# Patient Record
Sex: FEMALE | Race: WHITE | Employment: UNEMPLOYED | URBAN - METROPOLITAN AREA
[De-identification: names, ages, dates, MRNs, and addresses within clinical notes are randomized per-mention and may not be internally consistent; named-entity substitution may affect disease eponyms.]

---

## 2021-04-04 ENCOUNTER — HOSPITAL ENCOUNTER (INPATIENT)
Age: 21
LOS: 9 days | Discharge: HOME | DRG: 881 | End: 2021-04-13
Attending: PSYCHIATRY & NEUROLOGY | Admitting: PSYCHIATRY & NEUROLOGY
Payer: COMMERCIAL

## 2021-04-04 PROBLEM — F32.A DEPRESSION: Status: ACTIVE | Noted: 2021-04-04

## 2021-04-04 PROCEDURE — 65220000003 HC RM SEMIPRIVATE PSYCH

## 2021-04-04 PROCEDURE — 74011250637 HC RX REV CODE- 250/637: Performed by: PSYCHIATRY & NEUROLOGY

## 2021-04-04 RX ORDER — ACETAMINOPHEN 325 MG/1
650 TABLET ORAL
Status: DISCONTINUED | OUTPATIENT
Start: 2021-04-04 | End: 2021-04-13 | Stop reason: HOSPADM

## 2021-04-04 RX ORDER — ADHESIVE BANDAGE
30 BANDAGE TOPICAL DAILY PRN
Status: DISCONTINUED | OUTPATIENT
Start: 2021-04-04 | End: 2021-04-13 | Stop reason: HOSPADM

## 2021-04-04 RX ORDER — NORETHINDRONE ACETATE AND ETHINYL ESTRADIOL 1.5-30(21)
1 KIT ORAL
Status: DISCONTINUED | OUTPATIENT
Start: 2021-04-04 | End: 2021-04-13 | Stop reason: HOSPADM

## 2021-04-04 RX ORDER — HYDROXYZINE 50 MG/1
50 TABLET, FILM COATED ORAL
Status: DISCONTINUED | OUTPATIENT
Start: 2021-04-04 | End: 2021-04-13 | Stop reason: HOSPADM

## 2021-04-04 RX ORDER — HALOPERIDOL 5 MG/ML
5 INJECTION INTRAMUSCULAR
Status: DISCONTINUED | OUTPATIENT
Start: 2021-04-04 | End: 2021-04-13 | Stop reason: HOSPADM

## 2021-04-04 RX ORDER — DIPHENHYDRAMINE HYDROCHLORIDE 50 MG/ML
50 INJECTION, SOLUTION INTRAMUSCULAR; INTRAVENOUS
Status: DISCONTINUED | OUTPATIENT
Start: 2021-04-04 | End: 2021-04-13 | Stop reason: HOSPADM

## 2021-04-04 RX ORDER — TRAZODONE HYDROCHLORIDE 50 MG/1
50 TABLET ORAL
Status: DISCONTINUED | OUTPATIENT
Start: 2021-04-04 | End: 2021-04-13 | Stop reason: HOSPADM

## 2021-04-04 RX ORDER — OLANZAPINE 5 MG/1
5 TABLET ORAL
Status: DISCONTINUED | OUTPATIENT
Start: 2021-04-04 | End: 2021-04-13 | Stop reason: HOSPADM

## 2021-04-04 RX ORDER — LORAZEPAM 2 MG/ML
1 INJECTION INTRAMUSCULAR
Status: DISCONTINUED | OUTPATIENT
Start: 2021-04-04 | End: 2021-04-13 | Stop reason: HOSPADM

## 2021-04-04 RX ORDER — BENZTROPINE MESYLATE 1 MG/1
1 TABLET ORAL
Status: DISCONTINUED | OUTPATIENT
Start: 2021-04-04 | End: 2021-04-13 | Stop reason: HOSPADM

## 2021-04-04 RX ADMIN — NORETHINDRONE ACETATE AND ETHINYL ESTRADIOL 1 TABLET: KIT at 21:23
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
## 2021-04-05 PROCEDURE — 65220000003 HC RM SEMIPRIVATE PSYCH

## 2021-04-05 RX ORDER — MIRTAZAPINE 30 MG/1
30 TABLET, FILM COATED ORAL
Status: DISCONTINUED | OUTPATIENT
Start: 2021-04-05 | End: 2021-04-08

## 2021-04-05 RX ORDER — SERTRALINE HYDROCHLORIDE 25 MG/1
25 TABLET, FILM COATED ORAL DAILY
Status: DISCONTINUED | OUTPATIENT
Start: 2021-04-05 | End: 2021-04-08

## 2021-04-05 RX ADMIN — NORETHINDRONE ACETATE AND ETHINYL ESTRADIOL 1 TABLET: KIT at 22:04
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
## 2021-04-05 NOTE — BH NOTES
Patient laying in the bed and when writer entered her room and stated her name she opened her eyes and looked at the writer but did not say anything. Writer offered to get her something fresh to drink, a snack, and/or bring her dinner to her room for her. Patient did not say a word to the writer and continued to lay in the bed and tap her leg. Patient was educated that the doctor had started her on a new medication, Zoloft, but patient refused at this time and did not take. Patient continues to lay in the bed. Patient has not had breakfast or lunch today and has been encouraged both times as well as dinner but patient remains in her bed.

## 2021-04-05 NOTE — BH NOTES
DAYSHIFT NOTE:     Patient in the bed this morning and woke up when the writer entered her room to introduce self. Patient stated that she was \"doing good\" this morning. Patient denies having any depression and or anxiety. Denies SI/HI. Denies AH/VH. Patient does not have any scheduled medications ordered for during the day. Patient has remained in her bed. Patient has not gone to groups. Patient had both of her forearms wrapped with gauze this morning that she stated was done \"in the ER two hours away from here. \"     Wound care came to assess patient's wounds and writer went in the room with the wound care nurse to assess. Patient just stared out the window and never acknowledged the writer or the wound care nurse. Patient laying in the bed awake, no distress noted, but did not say one word to staff while we were in her room. Patient's arms had been unwrapped of the gauze which was removed from the patient's room and patient's arms were open to air. Patient has multiple superficial cuts to both her right and left forearms that will remain open to air. No drainage or signs of infection noted at this time. Patient continued to lay in her bed awake and mute. Will continue to monitor.

## 2021-04-05 NOTE — WOUND CARE
Evaluated bilateral forearm wounds. See photos in Media. Owen Colorado RN present during evaluation. Patient staring and non verbal, verbalized photo was being taken and purpose for  Monitoring wound healing progression, but patient did not respond. Reported roll gauze no longer on patient. Searched room and found in trash. Disposed of in trash at nurses station. No wound care recommendations at this time except to monitor for signs and symptoms of infection and report any changes. Please do not hesitate to consult wound care if further needs arise. Thanks.

## 2021-04-05 NOTE — BH NOTES
PSA ATTEMPT    The therapist met with the patient to attempt her PSA. The patient was laying down in bed when the therapist approached her. She was initially looking at the therapist and then shut her eyes for the remainder of the interaction. The patient was also completley  non-verbal when the therapist was speaking with her. She shook her head once to indicate no, which is the only movement she had. The patient was unable to be assessed at this time due to unwillingness to participate in treatment.

## 2021-04-05 NOTE — GROUP NOTE
[facility] GROUP DOCUMENTATION INDIVIDUAL Group Therapy Note Date: 4/5/2021 Group Start Time: 1100 Group End Time: 1200 Group Topic: Process Group - Inpatient [facility] Anabela Barry R 
 
[facility] GROUP DOCUMENTATION GROUP Group Therapy Note Attendees: 6/15 Patients encouraged to discuss the things that have been on their mind, the things that have been bothering them, the things that brought them to the hospital, the things that they need to process. Patients encouraged to be supportive of their peers and to share openly and honestly. Themes surrounding relationships, boundaries, and taking care of self before others emerged and were discussed. Attendance: Did not attend Patient's Goal:  n/a Interventions/techniques: n/a Follows Directions: n/a Interactions: n/a Mental Status: n/a Behavior/appearance: n/a 
 
Goals Achieved: n/a Additional Notes:  Pt encouraged but did not attend group. Angélica Tiwari
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
## 2021-04-05 NOTE — BH NOTES
TX PLAN    The patient declined to sign her treatment plan when the therapist had offered her, by remaining completely mute.

## 2021-04-05 NOTE — BH NOTES
Pt presents flat, somnolent, isolative to self in bed in dark, endorses fatigue, depression, anxiety, declined PRNs, denies urges at this time, states desire to simply sleep w/o further interruption, oriented to all spheres  No bx issues noted, med compliant  Denies SI HI NSSI denies sleep med appetite problems denies AVH  Continuing to monitor

## 2021-04-06 PROCEDURE — 65220000003 HC RM SEMIPRIVATE PSYCH

## 2021-04-06 PROCEDURE — 74011250637 HC RX REV CODE- 250/637: Performed by: PSYCHIATRY & NEUROLOGY

## 2021-04-06 RX ADMIN — NORETHINDRONE ACETATE AND ETHINYL ESTRADIOL 1 TABLET: KIT at 21:16

## 2021-04-06 RX ADMIN — SERTRALINE 25 MG: 25 TABLET, FILM COATED ORAL at 09:09
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
## 2021-04-06 NOTE — BH NOTES
Recreational Therapy Assessment       Pt was approach to complete RT assessment. Pt was lying in bed with eyes open and when name was called pt didn't respond verbally.  Will obtain information from chart

## 2021-04-06 NOTE — BH NOTES
Ms. Beverly Escudero 80-year-old female admitted to the behavioral health floor under TDO from [facility]. She was hospitalized for suicidal ideation and self-injurious behavior and superficial lacerations noted bilaterally on her forearms. Patient was transferred under my care. Patient did not want to participate in conversation. Patient reports she will talk later.   Denied having any questions to me did not voice any suicidal homicidal feelings currently    Mental status examination-  Patient did not want to participate in conversation    Assessment plan-  Continue current medications  Encourage group therapy  Continue close observation  Patient has not active suicidal ideations or plans      Current Facility-Administered Medications:     sertraline (ZOLOFT) tablet 25 mg, 25 mg, Oral, DAILY, Carlitos Jones MD    mirtazapine (REMERON) tablet 30 mg, 30 mg, Oral, QHS, Carlitos Jones MD    OLANZapine (ZyPREXA) tablet 5 mg, 5 mg, Oral, Q6H PRN, Carlitos Jones MD    haloperidol lactate (HALDOL) injection 5 mg, 5 mg, IntraMUSCular, Q6H PRN, Carlitos Adam MD    benztropine (COGENTIN) tablet 1 mg, 1 mg, Oral, BID PRN, Sushila Adam MD    diphenhydrAMINE (BENADRYL) injection 50 mg, 50 mg, IntraMUSCular, BID PRN, Sushila Adam MD    hydrOXYzine HCL (ATARAX) tablet 50 mg, 50 mg, Oral, TID PRN, Sushila Adam MD    LORazepam (ATIVAN) injection 1 mg, 1 mg, IntraMUSCular, Q4H PRN, Sasha Collado MD    traZODone (DESYREL) tablet 50 mg, 50 mg, Oral, QHS PRN, Sasha Collado MD, Stopped at 04/04/21 2123    acetaminophen (TYLENOL) tablet 650 mg, 650 mg, Oral, Q4H PRN, Carlitos Jones MD    magnesium hydroxide (MILK OF MAGNESIA) 400 mg/5 mL oral suspension 30 mL, 30 mL, Oral, DAILY PRN, Carlitos Jones MD    ziprasidone (GEODON) 20 mg in sterile water (preservative free) 1 mL injection, 20 mg, IntraMUSCular, Q12H PRN, Sasha Collado MD    norethindrone-ethinyl estradiol-iron (MICROGESTIN FE1.5/30) 1.5 mg-30 mcg (21)/75 mg (7) tablet tab 1 Tab (Patient Supplied), 1 Tab, Oral, QHS, Sally Celeste MD, 1 Tab at 04/04/21 2122

## 2021-04-06 NOTE — BH NOTES
[facility] Recreational Therapy Assessment    Orientation:  Person, Place, Date and Situation    Reason for Admission:  Pt initially reported \"she was admitted under a TDO after her roommate called the police on her for cutting both of her wrists\" Pt reported Tevin Marsh was not depressed or had any stressors\" When pt was asked by writer Marlene  was her trigger to cutting her wrists\" pt stated \"I don't know I just did it\" Pt became quiet and did not answer any more questions. Chart indicated pt locked herself in the bathroom after telling her friends she wanted to \"end it all\"    Medical Precautions / Conditions:  Did  not identify    Impairments:     Vision:  Wears Glasses Did not identify      Wears Contacts Did not identify      Are they with Patient Did not identify     Hearing Aids Did not identify     Utilization of Ambulatory Devices:  None    Health Problems Preventing Participation in Activities:  Unable to Answer  How:  n/a    Leisure Interest Checklist:  Did not identify    Does patient participate in leisure activities:  Unable to Answer    Setting:  Alone    Other Activities / Skills / Talents:  Did not identify    Do emotions interfere with leisure activities / lifestyles:  Unable to Answer    When engaging in leisure activities, do you forget worries:  Unable to Answer    Do you belong to a Yazidi:  Unable to Answer    Are you active in [employer] activities:  Unable to Answer    Typical Day:  Pt did not describe    Strengths:  Pt did not identify. Chart indicated pt is a college student at [employer]    Limitations / Barriers:  Pt did not identify.  Chart indicated pt declined in her mental status for past month, not eating, not going to classes and has a history of suicide attempts and hospitalizations and pt has been guarded and would not elaborate on stressors or triggers     Treatment Modalities:  Stress Management, Coping Skills, Symptom Recognition, Healthy Thinking, Mood Management and Leisure Skills    Patient Educational  Needs:  Skills to recognize and challenge problematic thinking, Identify positive coping strategies and skills to manage symptoms or moods, Leisure education and Recognition of symptoms and signs    Focus of Treatment:  Introduce positive outlets for self expression and Introduce and encourage the exploration of alternative coping skills    Summary:  Pt initially discussed her reasons for being admitted to the hospital and then became guarded and will not answer any more questions. Pt was approached by staff again and continue to be quiet and not respond verbally. Information obtained from chart and observation.

## 2021-04-06 NOTE — BH NOTES
Client is quiet and withdrawn. Sad affect and depressed mood. She has been lying quietly in bed with eyes closed. She did not eat breakfast but did eat potatoes and salad for lunch after much encouragement. She has been isolated in bed all day. Amits to feeling sad. She has not attended any groups today. Takes meds as prescribed and denies any side effects. Remains on close observation for safety.

## 2021-04-06 NOTE — GROUP NOTE
IP  GROUP DOCUMENTATION INDIVIDUAL Group Therapy Note Date: 4/6/2021 Group Start Time: 1300 Group End Time: 2350 Group Topic: Process Group - Inpatient [facility] 2  NON ACUTE Erin Narayanan [facility] GROUP DOCUMENTATION GROUP Group Therapy Note Attendees: 5 out of 14 
 
1pm Process Group Patients were invited to group and encouraged to discuss their thoughts, feelings, and emotions. Patients were then asked what their goal of the day was. Lastly, patients were encouraged to listen respectfully to and be supportive of their peers. Attendance: Did not attend Patient's Goal:  N/A Interventions/techniques: Other N/A Follows Directions: Other N/A Interactions: Other N/A Mental Status: Other N/A Behavior/appearance: N/A Goals Achieved: N/A Additional Notes:  Pt did not attend group despite having been encouraged to do so. [place]

## 2021-04-06 NOTE — PROGRESS NOTES
Problem: Depressed Mood (Adult/Pediatric)  Goal: *STG: Remains safe in hospital  Outcome: Progressing Towards Goal     Problem: Depressed Mood (Adult/Pediatric)  Goal: *STG: Complies with medication therapy  Outcome: Not Progressing Towards Goal     Patient has remained safe so far this shift. Patient refused her hs psychiatric medication. When staff approached the patient for the therapeutic interview the patient looked down and did not verbally respond. She has a flat and avoidant affect. She has not verbalized anything this shift except to ask for her birth control pills. She refused her hs Remeron. Patient did accept water from the staff. Continue to assess. Since going to bed, patient has been laying down quietly with her eyes closed.

## 2021-04-07 PROCEDURE — 65220000003 HC RM SEMIPRIVATE PSYCH

## 2021-04-07 PROCEDURE — 74011250637 HC RX REV CODE- 250/637: Performed by: PSYCHIATRY & NEUROLOGY

## 2021-04-07 RX ADMIN — NORETHINDRONE ACETATE AND ETHINYL ESTRADIOL 1 TABLET: KIT at 21:09

## 2021-04-07 RX ADMIN — MIRTAZAPINE 30 MG: 30 TABLET, FILM COATED ORAL at 21:09

## 2021-04-07 NOTE — PROGRESS NOTES
Problem: Depressed Mood (Adult/Pediatric)  Goal: *STG: Verbalizes anger, guilt, and other feelings in a constructive manor  Outcome: Progressing Towards Goal     Problem: Depressed Mood (Adult/Pediatric)  Goal: *STG: Remains safe in hospital  Outcome: Progressing Towards Goal     Problem: Depressed Mood (Adult/Pediatric)  Goal: *STG: Complies with medication therapy  Outcome: Progressing Towards Goal     Patient was able to express her feels clearly. Patient has been safe so far this shift. Patient was compliant with medication. Patient remains on close observation. Patient has been in her bed for the vast majority of the shift. Patient displayed little interest in discussing her condition, poor eye contact, not engaging, soft-voice, and avoidant. Patient denied depression, anxiety, SI or HI. Patient declined the HS Remeron and the physician was informed. Patient took her birth control pill. Continue to assess. Since going to bed, patient has been laying down quietly with her eyes closed.

## 2021-04-07 NOTE — BH NOTES
Pt,has been in room isolative, withdrawn,refuses her medication,no interaction with peers, refuses to talk to staff,  But does want to appeal her commitment hearing, feels like she is being held here for nothing,poor insight into issues,noother concerns voiced, remains on close observation,

## 2021-04-07 NOTE — BH NOTES
Patient continuing to decline conversation. She just tears does not want to talk. He is awake alert. She has been refusing medications and nightly has been not engaging in therapy she is not providing any information. She was discussed relating order to treat to be obtained if she continues to do outpatient treatment    Mental status examination-patient is alert oriented to name place does not want to communicate to let any of the providers. Has been a few medications. Insight judgment coping poor she is defiant dismissive and does not want to participate she presents depressed. Assessment and plan  Continue to provide encouragement to share her stressors.   She does not communicate  Continue to assess need for order to treat  Encouraged to participate in treatment      Current Facility-Administered Medications:     sertraline (ZOLOFT) tablet 25 mg, 25 mg, Oral, DAILY, Fanny Rios MD, 25 mg at 04/06/21 0909    mirtazapine (REMERON) tablet 30 mg, 30 mg, Oral, QHS, Carlitos Jones MD    OLANZapine (ZyPREXA) tablet 5 mg, 5 mg, Oral, Q6H PRN, Konrad Jones MD    haloperidol lactate (HALDOL) injection 5 mg, 5 mg, IntraMUSCular, Q6H PRN, Fanny Rios MD    benztropine (COGENTIN) tablet 1 mg, 1 mg, Oral, BID PRN, Fanny Rios MD    diphenhydrAMINE (BENADRYL) injection 50 mg, 50 mg, IntraMUSCular, BID PRN, Fanny Rios MD    hydrOXYzine HCL (ATARAX) tablet 50 mg, 50 mg, Oral, TID PRN, Konrad Jones MD    LORazepam (ATIVAN) injection 1 mg, 1 mg, IntraMUSCular, Q4H PRN, Fanny Rios MD    traZODone (DESYREL) tablet 50 mg, 50 mg, Oral, QHS PRN, Fanny Rios MD, Stopped at 04/04/21 2123    acetaminophen (TYLENOL) tablet 650 mg, 650 mg, Oral, Q4H PRN, Carlitos Jones MD    magnesium hydroxide (MILK OF MAGNESIA) 400 mg/5 mL oral suspension 30 mL, 30 mL, Oral, DAILY PRN, Carlitos Jones MD    ziprasidone (GEODON) 20 mg in sterile water (preservative free) 1 mL injection, 20 mg, IntraMUSCular, Q12H PRN, Alley Ibrahim MD    norethindrone-ethinyl estradiol-iron (MICROGESTIN FE1.5/30) 1.5 mg-30 mcg (21)/75 mg (7) tablet tab 1 Tab (Patient Supplied), 1 Tab, Oral, QHS, Alley Ibrahim MD, 1 Tab at 04/06/21 6660

## 2021-04-07 NOTE — BH NOTES
TDO DISPO:      Patient was committed for up to 7 days on 4/7/21 per Jerlyn Runner, and was represented by Lisa Ritter. Paperwork placed on pt chart, nursing staff notified.

## 2021-04-07 NOTE — BH NOTES
Behavioral Health Treatment Team Note     Patient goal(s) for today: Earle Nath with stress   Treatment team focus/goals: To continue group therapy and medication management. Progress note: The patient was presenting with a mostly flat affect and congruent mood. She spoke about how she is with having to be committed. She said that she would like to appeal her commitment, and the therapist provided her with the 's information and told her to call him. She denied SI/HI and AH/VH's. She spoke about how she didn't understand why they committed her, although was also able to identify that she is depressed and impulsive. She denied again that the cuts she made to her forearm was a suicide attempt. She said that she didn't want to die but she was just \"tired\". The therapist questioned about what happened again since she denied what was written on the police report. She said that she was not locked in the bathroom because their bathroom does not have a lock on it. She said that she had come home from a party and was feeling depressed. She said that she cut her forearm superficially multiple times and then went to the kitchen to get a snack, which is when her roommate saw her crying. She said that she does not know who called the police, but she assumes it was one of her roommates. She also spoke about work she was doing with her therapist from [facility]. The therapist spoke about all or nothing thinking with her. The therapist also brought up medications again, and spoke about how a mood stabilizer might benefit her since she has never been on one before. She said that she has been on zoloft, prozac, wellbutrin, and abilify in the past at separate times. Initailly she denied that medication has helped her at all, and then said that she was able to recognize and improvement in her mood after she had been taking it daily for years.  An inpatient level of care is still required because the patient is still having a difficult time managing her mood. LOS:  3  Expected LOS: Committed up to 15 days from today. Insurance info/prescription coverage:  [employer] [place] of [place]. Date of last family contact:  4/7/21  Family requesting physician contact today:  no  Discharge plan: The therapist will continue to explore options with the patient. Guns in the home:  no   Outpatient provider(s):  [facility].     Participating treatment team members: Tyrel Gayle LMSW

## 2021-04-07 NOTE — BH NOTES
Behavioral Health Treatment Team Note     Patient goal(s) for today: Unable to assess at this time due to the patient being mute. Treatment team focus/goals: To continue group therapy and medication management. Progress note: The patient was laying in her bed when the therapist approached her. She initially had her eyes open and then she closed them for the remainder of the time the therapist was trying to speak with her. She did not respond to any questions being asked by the therapist. An inpatient level of care is still necessary because the patient is not participating in treatment and is selectively mute with staff. LOS:  3  Expected LOS: 5-7 days, until her hearing tomorrow on 4/7/21. Insurance info/prescription coverage:  [employer] out of state. Date of last family contact:  4/7/21  Family requesting physician contact today:  no  Discharge plan: The therapist will continue to explore options with the patient. Guns in the home:  no   Outpatient provider(s):  Unknown at this time.      Participating treatment team members: Tyrel Gayle LMSW

## 2021-04-07 NOTE — GROUP NOTE
DARRIAN  GROUP DOCUMENTATION INDIVIDUAL Group Therapy Note Date: 4/7/2021 Group Start Time: 1100 Group End Time: 1026 Group Topic: Education Group - Inpatient [facility] Group Mackenzie COLMENARES  GROUP DOCUMENTATION GROUP Group Therapy Note Attendees: All pts were encouraged to attend but only 3 out of 12 came. The topic was safety planning. The pts were asked to introduce themselves and to state their current mood. One of the pts already completed a SP so he just sat in and listened while the other two completed their SPs and shared. In the end they were collected and asked to reflect on doing the SP. Attendance: Did not attend [facility]

## 2021-04-08 PROCEDURE — 65220000003 HC RM SEMIPRIVATE PSYCH

## 2021-04-08 PROCEDURE — 74011250637 HC RX REV CODE- 250/637: Performed by: PSYCHIATRY & NEUROLOGY

## 2021-04-08 RX ORDER — MIRTAZAPINE 15 MG/1
15 TABLET, FILM COATED ORAL
Status: DISCONTINUED | OUTPATIENT
Start: 2021-04-08 | End: 2021-04-13 | Stop reason: HOSPADM

## 2021-04-08 RX ADMIN — MIRTAZAPINE 15 MG: 15 TABLET, FILM COATED ORAL at 21:14

## 2021-04-08 RX ADMIN — NORETHINDRONE ACETATE AND ETHINYL ESTRADIOL 1 TABLET: KIT at 21:14

## 2021-04-08 NOTE — BH NOTES
COLLATERAL ATTEMPTED     Writer attempted to contact pts mother Priscilla Hicks at [phone]. No answer, VM left, with CM call back number.

## 2021-04-08 NOTE — GROUP NOTE
IP  GROUP DOCUMENTATION INDIVIDUAL Group Therapy Note Date: 4/8/2021 Group Start Time: 0171 Group End Time: 2643 Group Topic: Recreational/Music Therapy SRM 2  NON ACUTE [facility] GROUP DOCUMENTATION GROUP Group Therapy Note Facilitated leisure skills group focused on positive coping skills through social interactions, group activities, music and art tasks Attendees: 5/13 Attendance: Attended Patient's Goal:  *STG: Attends activities and groups Interventions/techniques: Art integration and Supported Follows Directions: Followed directions Interactions: Interacted appropriately Mental Status: Calm Behavior/appearance: Cooperative Goals Achieved: Able to engage in interactions and Able to listen to others Additional Notes:  Pt was receptive to music and songs she selected while in group. Pt was seen working on leisure packet during group time. Oliva Hawk, RT Intern

## 2021-04-08 NOTE — BH NOTES
Patient has been able to share some information and was able to participate in some discussion, still remains dismissive, depressed, do not want to take meds and has been isolating  No si/hi  Voiced  Denies any hallu or paranoia  Sleep states was okay    A/p  Pt encouraged to be compliant with treatment, participating in her therapy  TDO hearing today  No si/hi  Pt has been not taking her meds consisitently as per report    Current Facility-Administered Medications:     sertraline (ZOLOFT) tablet 25 mg, 25 mg, Oral, DAILY, Emeterio Lewis MD, 25 mg at 04/06/21 0909    mirtazapine (REMERON) tablet 30 mg, 30 mg, Oral, QHS, Emeterio Lewis MD, 30 mg at 04/07/21 2109    OLANZapine (ZyPREXA) tablet 5 mg, 5 mg, Oral, Q6H PRN, Cj Jones MD    haloperidol lactate (HALDOL) injection 5 mg, 5 mg, IntraMUSCular, Q6H PRN, Emeterio Lewis MD    benztropine (COGENTIN) tablet 1 mg, 1 mg, Oral, BID PRN, Cj Jones MD    diphenhydrAMINE (BENADRYL) injection 50 mg, 50 mg, IntraMUSCular, BID PRN, Cj Jones MD    hydrOXYzine HCL (ATARAX) tablet 50 mg, 50 mg, Oral, TID PRN, Cj Jones MD    LORazepam (ATIVAN) injection 1 mg, 1 mg, IntraMUSCular, Q4H PRN, Emeterio Lewis MD    traZODone (DESYREL) tablet 50 mg, 50 mg, Oral, QHS PRN, Emeterio Lewis MD, Stopped at 04/04/21 2123    acetaminophen (TYLENOL) tablet 650 mg, 650 mg, Oral, Q4H PRN, Cj Jones MD    magnesium hydroxide (MILK OF MAGNESIA) 400 mg/5 mL oral suspension 30 mL, 30 mL, Oral, DAILY PRN, Carlitos Jones MD    ziprasidone (GEODON) 20 mg in sterile water (preservative free) 1 mL injection, 20 mg, IntraMUSCular, Q12H PRN, Emeterio Lewis MD    norethindrone-ethinyl estradiol-iron (MICROGESTIN FE1.5/30) 1.5 mg-30 mcg (21)/75 mg (7) tablet tab 1 Tab (Patient Supplied), 1 Tab, Oral, QHS, [facility] MD Joshua, 1 Tab at 04/07/21 7693

## 2021-04-08 NOTE — BH NOTES
Patient isolative, no social interaction noted, and has not attended groups. Patient remains in bed, refused zoloft this morning. Affect flat. Healing superficial cuts noted to bilateral lower inner arms. Patient denied depression, anxiety, AH, VH, SI, and HI. Patient remains on close observation, Q 15 minute checks.

## 2021-04-08 NOTE — GROUP NOTE
[facility] GROUP DOCUMENTATION INDIVIDUAL Group Therapy Note Date: 4/8/2021 Group Start Time: 1300 Group End Time: 1400 Group Topic: Process Group - Inpatient SRM CARE MANAGEMENT Estuardo Olmstead [facility] GROUP DOCUMENTATION GROUP Group Therapy Note Attendees: 4 out of 13 participants engaged in process group. Patient discussed feelings, emotions and triggers. Patients provided feedback to each other on how to cope and strategies coping skills to prepare for discharge. Attendance: Did not attend Additional Notes:  Patient decline to attend group. Patient was encouraged to discuss and processing feelings during next session. Huma Galdamez

## 2021-04-08 NOTE — BH NOTES
Patient has been slightly cooperative she has been engaging in discussion. She is committed for up to 7 days. Does report that she wants to appeal the commitment. Patient agrees to consider Abilify to help with her mood swings and depression. She is also been provided education psychoeducation her challenges and her feelings and her diagnosis. Not voicing any active suicidal or homicidal ideations    Mental status examination-patient is alert oriented x3 casually dressed groomed limited eye contact somewhat limited in accepting treatment but has been cooperative has been taking her medications no active evidence of psychosis Insight judgment coping remains impaired    Assessment plan  Start Abilify 5 mg p.o. daily to address mood depression, mood swings. Continue Remeron  Denies any side effects from medications currently  We will continue to assess.   OCD symptoms      Current Facility-Administered Medications:     mirtazapine (REMERON) tablet 30 mg, 30 mg, Oral, QHS, Carlitos Jones MD, 30 mg at 04/07/21 2109    OLANZapine (ZyPREXA) tablet 5 mg, 5 mg, Oral, Q6H PRN, Aubrey Jones MD    haloperidol lactate (HALDOL) injection 5 mg, 5 mg, IntraMUSCular, Q6H PRN, Carlitos Jones MD    benztropine (COGENTIN) tablet 1 mg, 1 mg, Oral, BID PRN, Aubrey Jones MD    diphenhydrAMINE (BENADRYL) injection 50 mg, 50 mg, IntraMUSCular, BID PRN, Sharath Styles MD    hydrOXYzine HCL (ATARAX) tablet 50 mg, 50 mg, Oral, TID PRN, Carlitos Jones MD    LORazepam (ATIVAN) injection 1 mg, 1 mg, IntraMUSCular, Q4H PRN, Sharath Styles MD    traZODone (DESYREL) tablet 50 mg, 50 mg, Oral, QHS PRN, Sharath Styles MD, Stopped at 04/04/21 2123    acetaminophen (TYLENOL) tablet 650 mg, 650 mg, Oral, Q4H PRN, Carlitos Jones MD    magnesium hydroxide (MILK OF MAGNESIA) 400 mg/5 mL oral suspension 30 mL, 30 mL, Oral, DAILY PRN, Carlitos Jones MD    ziprasidone (GEODON) 20 mg in sterile water (preservative free) 1 mL injection, 20 mg, IntraMUSCular, Q12H PRN, Susan Ayon MD    norethindrone-ethinyl estradiol-iron (MICROGESTIN FE1.5/30) 1.5 mg-30 mcg (21)/75 mg (7) tablet tab 1 Tab (Patient Supplied), 1 Tab, Oral, QHS, Susan Ayon MD, 1 Tab at 04/07/21 7183

## 2021-04-08 NOTE — BH NOTES
Pt aware she is committed for 7 days. She was refusing meds during the day but after having a nice long talk with patient before bed she agreed to take the Remeron if it will help her depression and get her out of here. She also says she has taken Zoloft in the past and does not like the side affects and says she informed the doctor that she did not want to take it again. She denies having depression now but does give her an anxiety of 3/10. She says that the depression had been building for awhile and that she did not know how to cope so that was why she was hurting herself/cutting. Pt has been resting well with no distress noted. Respirations regular and unlabored. Will continue to monitor patient [id number] as per unit protocol.

## 2021-04-08 NOTE — BH NOTES
Behavioral Health Treatment Team Note     Patient goal(s) for today: declined to give one  Treatment team focus/goals: medication management, group therapy, family session, DC planning    Progress note: Pt was lying in bed and sat us for check-in. She presented w a flat affect and depressed mood. She appeared annoyed. She reported feeling \"good. \" She denied SI, HI, AVH. She denied depressed and anxiety. DOUG rushing set up family session and continue to work w Dr for OMARI Heard.      LOS:  4  Expected LOS: 15 days     Insurance info/prescription coverage:  [facility] [employer] [place]  Date of last family contact:  yesterday  Family requesting physician contact today: no  Discharge plan:  Therapist will continue to explore options w the pt  Guns in the home: no  Outpatient provider(s):  [facility]     Participating treatment team members: Cheng Kiran, * (assigned SW), JORGE Silverio intern

## 2021-04-08 NOTE — GROUP NOTE
[facility] GROUP DOCUMENTATION INDIVIDUAL Group Therapy Note Date: 4/8/2021 Group Start Time: 1100 Group End Time: 5339 Group Topic: Education Group - Inpatient [facility] Group Dayna Zuluaga [facility] GROUP DOCUMENTATION GROUP Group Therapy Note Attendees: All pts were encouraged to attend but only 7 out of 12 attended. The topic was trauma and the common responses to it. The pts were given two worksheets and asked to introduce themselves as well as state something positive that has happened to them recently. They were then given a trigger warning and told to leave if they needed to. They were asked to give examples of trauma and their own feelings or reactions to a traumatic event. We went through the common reactions and treatment together and they were then asked to list healthy and unhealthy coping skills for trauma. In the end they were asked to reflect on the group and how they felt. Attendance: Did not attend [facility]

## 2021-04-09 PROCEDURE — 74011250637 HC RX REV CODE- 250/637: Performed by: PSYCHIATRY & NEUROLOGY

## 2021-04-09 PROCEDURE — 65220000003 HC RM SEMIPRIVATE PSYCH

## 2021-04-09 RX ORDER — ARIPIPRAZOLE 5 MG/1
5 TABLET ORAL DAILY
Status: DISCONTINUED | OUTPATIENT
Start: 2021-04-09 | End: 2021-04-12

## 2021-04-09 RX ADMIN — TRAZODONE HYDROCHLORIDE 50 MG: 50 TABLET ORAL at 20:41

## 2021-04-09 RX ADMIN — NORETHINDRONE ACETATE AND ETHINYL ESTRADIOL 1 TABLET: KIT at 21:00

## 2021-04-09 RX ADMIN — ARIPIPRAZOLE 5 MG: 5 TABLET ORAL at 15:44

## 2021-04-09 RX ADMIN — MIRTAZAPINE 15 MG: 15 TABLET, FILM COATED ORAL at 20:42

## 2021-04-09 NOTE — GROUP NOTE
IP  GROUP DOCUMENTATION INDIVIDUAL Group Therapy Note Date: 4/9/2021 Group Start Time: 5422 Group End Time: 5191 Group Topic: Recreational/Music Therapy SRM 2  NON ACUTE Dione Dubon [facility] GROUP DOCUMENTATION GROUP Group Therapy Note Facilitated leisure skills group focused on positive coping skills through social interactions, group activities, music and art tasks Attendees: 7/14 Attendance: Attended Patient's Goal:  *STG: Attends activities and groups Interventions/techniques: Art integration and Supported Follows Directions: Followed directions Interactions: Interacted appropriately Mental Status: Calm Behavior/appearance: Cooperative Goals Achieved: Able to engage in interactions and Able to listen to others Additional Notes:  Pt was receptive to music and song she selected while in group. Pt was seen working on leisure packet and interacting with peers. Rene Johnson, RT Intern

## 2021-04-09 NOTE — BH NOTES
Client is quiet and withdrawn. Alert and oriented x 3. She has a little bit better appetite (ate about 60 percent). Conversation is superficial.Not attending scheduled groups and unit activities. Remains on close observation for safety.

## 2021-04-09 NOTE — GROUP NOTE
[facility] GROUP DOCUMENTATION INDIVIDUAL Group Therapy Note Date: 4/9/2021 Group Start Time: 1100 Group End Time: 1407 Group Topic: Education Group - Inpatient [facility] Group Kot[place]iqui Virgen [facility] GROUP DOCUMENTATION GROUP Group Therapy Note Attendees: All pts were encouraged to attend but only 10 out of 14 attended. The topic was mindfulness so the group did a 10 minute guided meditation. They were asked to introduce themselves and to state their current mood. They were then asked if they had meditated before and if they knew what mediation was. They were then asked if it was helpful in the past. After the mediation they were asked if it was helpful and if they had any questions about mindfulness and meditation. Attendance: Attended Patient's Goal:  To attend groups and activities Interventions/techniques: Challenged, Informed, Validated, Promoted peer support, Provide feedback and Supported Follows Directions: Followed directions Interactions: Interacted appropriately Mental Status: Calm and Congruent Behavior/appearance: Attentive, Cooperative and Withdrawn/quiet Goals Achieved: Able to listen to others, Able to self-disclose and Identified feelings Additional Notes:  Pt did not participate but reported feeling \"good. \" 
 
Noel Murrell

## 2021-04-09 NOTE — BH NOTES
COLLATERAL     Writer contacted pts Mom Anthony Donato to obtain collateral information. Anthony Donato stated that the pt had moved to [place] in January of this year to attend school at [employer]. 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 She stated that the pt began missing classes to sleep and that she often has trouble sleeping at night. Anthony Donato stated that she felt the pt was a perfectionist and aimed to get A's in classes and that often overwhelmed her and led to increased depression. Anthony Donato confirmed that cutting is the pt's primary form of self harm and that she has been hospitalized for it in the past. She stated that the pt has been seeing a counselor since the age of 15 and that she has a previous diagnosis of Borderline personality disorder. Mom stated that the pt attempted to take a 'step back' from her overloaded schedule however could not follow through. Mom stated that she does not know how to stick up for herself and that often leads to her taking on to much. Anthony Donato stated that the pt had a previous boyfriend and that may have led to her recent suicide attempt. Anthony Donato informed this writer that a previous counselor suggested that the pt has aspergers, and that she is in agreement with this tentative diagnosis. Anthony Donato stated that the pt had reached out to the counseling center at her school and that she was 'looking for answers', she reported that when the pt did not have answers for what to do it irritated her. Anthony Donato spoke to the pt on Sunday night, and reported that she was angry and volatile and believed that she did not need to be here.

## 2021-04-09 NOTE — BH NOTES
Behavioral Health Treatment Team Note     Patient goal(s) for today: 'get out of my room more'   Treatment team focus/goals: group therapy, collateral, medication management    Progress note: Pt seen sitting up in bed reading a book when speaking with this writer. Pt presented with a broad, slightly flat, affect and congruent mood. Pt stated that she would like to go home today. This writer explained that she would most likely not dc today and at the earliest it would be Monday. Pt stated that she was disappointed with this news but that she would be okay. Pt denied any SI/HI/AVH anxiety or depression at this time. Pt stated that she would like to go home so that she may be able to attend classes. Pt in need of inpatient level of care to continue medication management, and to plan for a safe dc. LOS:  5  Expected LOS: Committed up to 4/13    Insurance info/prescription coverage:  [employer] out of state. Date of last family contact:  Attempted 4/8/21  Family requesting physician contact today:  no  Discharge plan:   The therapist will continue to work on exploring dc options   Guns in the home:  no   Outpatient provider(s):  [facility]     Participating treatment team members: Cj Fernandez, * (assigned SW), [employer]

## 2021-04-09 NOTE — PROGRESS NOTES
Problem: Depressed Mood (Adult/Pediatric)  Goal: *STG: Participates in treatment plan  Outcome: Progressing Towards Goal  Goal: *STG: Verbalizes anger, guilt, and other feelings in a constructive manor  Outcome: Progressing Towards Goal  Goal: *STG: Attends activities and groups  Outcome: Progressing Towards Goal  Goal: *STG: Remains safe in hospital  Outcome: Progressing Towards Goal  Goal: *STG: Complies with medication therapy  Outcome: Progressing Towards Goal  Goal: *LTG: Understands illness and can identify signs of relapse  Outcome: Progressing Towards Goal

## 2021-04-09 NOTE — BH NOTES
Patient this morning has been cooperative and limited conversation still mostly noted to herself. She has been compliant with her medication. Denies any side effects. No self-harm gestures noted    Mental status examination-patient is alert oriented x3 speech is coherent denies any active suicidal homicidal ideations intent or thought appears and is encouraged limited interactions.   But has been cooperative    Assessment and plan  Family meeting to be scheduled  and collateral reviewed  We will start Abilify 5 mg p.o. to be related to continue to address her mood depression  Continue to further assess her OCD symptoms and anxiety and explore substance abused  s  .me

## 2021-04-09 NOTE — BH NOTES
Patient is quiet, stays to self. Appears sad, but denies depression at this time and only slightly anxious because of being committed. Pt was med compliant tonight and  Is calm and cooperative, very pleasant. Denies SI, HI and hallucinations. Pt resting well with no distress noted. Respirations are regular and unlabored. Will continue to monitor patient every 15mins as per unit protocol.

## 2021-04-09 NOTE — BH NOTES
COLLATERAL     Writer spoke to the pts therapist, Jorge Chavarria. Haley Boland stated that she had only seen the pt twice and that she did not have much information to share. She stated that she felt that the pt was adjusting to college and was having a difficult time doing that. She did not know any details about her mental health history. Haley Boland stated that she was aware of past attempts however she could not provide any details on the pts mental health. Elena advised the writer to call back early in next week and she would be able to provide more information.

## 2021-04-09 NOTE — GROUP NOTE
DARRIAN  GROUP DOCUMENTATION INDIVIDUAL Group Therapy Note Date: 4/9/2021 Group Start Time: 1300 Group End Time: 0857 Group Topic: Process Group - Inpatient [facility] Group Blanca COLMENARES  GROUP DOCUMENTATION GROUP Group Therapy Note Attendees: 4/14 Process: Pts were asked something they want to learn as a check in question. Pts were then encouraged to share what brought them to the hospital. Pts were encouraged to provide feedback to one another. Pts were then walked through a breathing exercise and asked to reflect on group Attendance: Did not attend Additional Notes:  Pt was encouraged to attend but chose not to do so [employer] WNWD, NAD, active, pink w/o jaundice, wt 3498g down 4.2%  Chest clear w/o murmur  Abd lg but soft, no HSM/MOT  T1 male, testes down

## 2021-04-10 PROCEDURE — 65220000003 HC RM SEMIPRIVATE PSYCH

## 2021-04-10 PROCEDURE — 74011250637 HC RX REV CODE- 250/637: Performed by: PSYCHIATRY & NEUROLOGY

## 2021-04-10 RX ADMIN — NORETHINDRONE ACETATE AND ETHINYL ESTRADIOL 1 TABLET: KIT at 21:42

## 2021-04-10 RX ADMIN — MIRTAZAPINE 15 MG: 15 TABLET, FILM COATED ORAL at 21:42

## 2021-04-10 RX ADMIN — ARIPIPRAZOLE 5 MG: 5 TABLET ORAL at 09:16

## 2021-04-10 NOTE — GROUP NOTE
[facility] GROUP DOCUMENTATION INDIVIDUAL Group Therapy Note Date: 4/10/2021 Group Start Time: 1100 Group End Time: 3753 Group Topic: Process Group - Inpatient SRM 2  NON ACUTE Erin Narayanan [facility] GROUP DOCUMENTATION GROUP Group Therapy Note Attendees: 10 out of 14 
 
11am Process Group Patients were invited to group and encouraged to share their thoughts, feelings, and emotions. Patients were asked to share their goal for the day with the group. Patients were also asked what they feel needs to change in their lives in order to improve their mental health. Lastly, the patients were asked to listen respectfully to and be supportive of their peers. Attendance: Attended Patient's Goal:  Develop coping skills Interventions/techniques: Validated, Promoted peer support, Provide feedback and Supported Follows Directions: Followed directions Interactions: Interacted appropriately Mental Status: Calm and Flat Behavior/appearance: Attentive and Cooperative Goals Achieved: Able to engage in interactions, Able to listen to others, Able to give feedback to another, Able to reflect/comment on own behavior, Able to manage/cope with feelings, Able to receive feedback, Able to experience relief/decrease in symptoms, Able to self-disclose, Discussed discharge plans, Displayed empathy, Identified feelings, Identified triggers and Verbalized increased hopefulness Additional Notes:  Pt stated that her goal for today is \"to get out of my room. \" Pt also spoke about getting on and staying on the right medications so that she can feel well mentally. Pt empathized with peers when they stated that they were having a difficult time getting psychiatry appointments because everyone is so booked. Pt was calm and appropriate while in group. [place]

## 2021-04-10 NOTE — PROGRESS NOTES
Problem: Depressed Mood (Adult/Pediatric)  Goal: *STG: Remains safe in hospital  Outcome: Progressing Towards Goal  Goal: *STG: Complies with medication therapy  Outcome: Progressing Towards Goal     Problem: Depressed Mood (Adult/Pediatric)  Goal: *STG: Attends activities and groups  Outcome: Not Progressing Towards Goal  Pt has isolated in her room, only out to have needs met and returned back to her room lying down while eyes closed. Pt denied feeling depression and thoughts of self harm. Pt was encouraged to spend time in the dayroom with her peers and to attend structured groups to no avail. Will continue to monitor pt closely and follow treatment plan as written.

## 2021-04-10 NOTE — BH NOTES
Patient has been visible on the unit. Mostly in her room, resting quietly. Her affect is dull, but she animated. She denies depression, anxiety, SI, HI, VH & AH. She medication compliant. Patient stated that she did want hurt herself earlier but not now. 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 She remains on close observation for safety.

## 2021-04-10 NOTE — GROUP NOTE
DARRIAN  GROUP DOCUMENTATION INDIVIDUAL Group Therapy Note Date: 4/10/2021 Group Start Time: 2795 Group End Time: 5257 Group Topic: Nursing SRM 2 BEHA [facility] ACUTE Ben Treadwell LPN 
 
[facility] GROUP DOCUMENTATION GROUP Group Therapy Note Attendees: 5/14 Attendance: Did not attend Patient's Goal: ID benefits of Medications Interventions/techniques: Follows Directions:  
 
Interactions:  
 
Mental Status:  
 
Behavior/appearance:  
 
Goals Achieved: Additional Notes: Pt. Invited did not attend.   
Jenae Ramirez LPN

## 2021-04-10 NOTE — BH NOTES
Behavioral Health Treatment Team Note     Patient goal(s) for today: 'be positive   Treatment team focus/goals: medication management, dc planning     Progress note: Pt presented with a flat affect and depressed mood. Pt reported that she felt good this morning and that she has been attending some groups. Pt was very flat and did not provide detailed answers to this writers questions. Pt denies any SI/AVH/HI depression or anxiety this morning. Pt in need of inpatient level of care due to continued flat presentation and severity of sucide attempt.     LOS:  6  Expected LOS: 5-7    Insurance info/prescription coverage:   [employer] [place] of [place].   Date of last family contact:  4/9/21  Family requesting physician contact today:  no  Discharge plan:  Therapist and pt will work together to determine [address] [place] [address] in the home:  no   Outpatient provider(s):  [facility]     Participating treatment team members: Lisa Lui, * (assigned SW), Erlinda Doan

## 2021-04-10 NOTE — CONSULTS
Consult    Patient: Eli Fletcher MRN: [id number]  SSN: [id number]    YOB: 2000  Age: 21 y.o. Sex: female      Subjective:      Eli Fletcher is a 21 y.o. female who is being seen for depression denies any medical history of cough chest pain fever or chills    No past medical history on file. No past surgical history on file. No family history on file.   Social History     Tobacco Use    Smoking status: Not on file   Substance Use Topics    Alcohol use: Not on file      Current Facility-Administered Medications   Medication Dose Route Frequency Provider Last Rate Last Admin    ARIPiprazole (ABILIFY) tablet 5 mg  5 mg Oral DAILY Chelsea Solo MD   5 mg at 04/10/21 0916    mirtazapine (REMERON) tablet 15 mg  15 mg Oral QHS Alan Figueroa MD   Stopped at 04/09/21 2200    OLANZapine (ZyPREXA) tablet 5 mg  5 mg Oral Q6H PRN Esperanza Feliciano MD        haloperidol lactate (HALDOL) injection 5 mg  5 mg IntraMUSCular Q6H PRN Esperanza Feliciano MD        benztropine (COGENTIN) tablet 1 mg  1 mg Oral BID PRN Esperanza Feliciano MD        diphenhydrAMINE (BENADRYL) injection 50 mg  50 mg IntraMUSCular BID PRN Koduri, Vila Leyden, MD        hydrOXYzine HCL (ATARAX) tablet 50 mg  50 mg Oral TID PRN Esperanza Feliciano MD        LORazepam (ATIVAN) injection 1 mg  1 mg IntraMUSCular Q4H SHIVAM Feliciano MD        traZODone (DESYREL) tablet 50 mg  50 mg Oral QHS PRN Esperanza Feliciano MD   50 mg at 04/09/21 2041    acetaminophen (TYLENOL) tablet 650 mg  650 mg Oral Q4H PRN Esperanza Feliciano MD        magnesium hydroxide (MILK OF MAGNESIA) 400 mg/5 mL oral suspension 30 mL  30 mL Oral DAILY PRN Esperanza Feliciano MD        ziprasidone (GEODON) 20 mg in sterile water (preservative free) 1 mL injection  20 mg IntraMUSCular Q12H PRPORTER Feliciano MD        norethindrone-ethinyl estradiol-iron (MICROGESTIN FE1.5/30) 1.5 mg-30 mcg (21)/75 mg (7) tablet tab 1 Tab (Patient Supplied)  1 Tab Oral QHS Michelet Marie, Sushila Nava MD   1 Tab at 04/09/21 2100        No Known Allergies    Review of Systems:  A comprehensive review of systems was negative except for that written in the History of Present Illness. Objective:     Vitals:    04/09/21 0806 04/09/21 1117 04/09/21 2015 04/10/21 0906   BP: 95/66 95/66 132/74 94/60   Pulse: 72 72 67 63   Resp: 18 18 18 18   Temp: 98.4 °F (36.9 °C) 98.4 °F (36.9 °C) 97.8 °F (36.6 °C) 98 °F (36.7 °C)   SpO2: 97%      Weight:       Height:            Physical Exam:  General:  Alert, cooperative, no distress, appears stated age. Eyes:  Conjunctivae/corneas clear. PERRL, EOMs intact. Fundi benign   Ears:  Normal TMs and external ear canals both ears. Nose: Nares normal. Septum midline. Mucosa normal. No drainage or sinus tenderness. Mouth/Throat: Lips, mucosa, and tongue normal. Teeth and gums normal.   Neck: Supple, symmetrical, trachea midline, no adenopathy, thyroid: no enlargment/tenderness/nodules, no carotid bruit and no JVD. Back:   Symmetric, no curvature. ROM normal. No CVA tenderness. Lungs:   Clear to auscultation bilaterally. Heart:  Regular rate and rhythm, S1, S2 normal, no murmur, click, rub or gallop. Abdomen:   Soft, non-tender. Bowel sounds normal. No masses,  No organomegaly. Extremities: Extremities normal, atraumatic, no cyanosis or edema. Pulses: 2+ and symmetric all extremities. Skin: Skin color, texture, turgor normal. No rashes or lesions   Lymph nodes: Cervical, supraclavicular, and axillary nodes normal.   Neurologic: CNII-XII intact. Normal strength, sensation and reflexes throughout. No results found for this or any previous visit (from the past 24 hour(s)).     Assessment:     Hospital Problems  Never Reviewed          Codes Class Noted POA    Depression ICD-10-CM: F32.9  ICD-9-CM: 125  4/4/2021 Unknown              Plan:     Continue present treatment    Signed By: Sailaja Brown MD     April 10, 2021

## 2021-04-11 PROCEDURE — 74011250637 HC RX REV CODE- 250/637: Performed by: PSYCHIATRY & NEUROLOGY

## 2021-04-11 PROCEDURE — 65220000003 HC RM SEMIPRIVATE PSYCH

## 2021-04-11 RX ADMIN — NORETHINDRONE ACETATE AND ETHINYL ESTRADIOL 1 TABLET: KIT at 22:26

## 2021-04-11 RX ADMIN — ARIPIPRAZOLE 5 MG: 5 TABLET ORAL at 08:50

## 2021-04-11 RX ADMIN — MIRTAZAPINE 15 MG: 15 TABLET, FILM COATED ORAL at 22:26

## 2021-04-11 NOTE — BH NOTES
Patient has been withdrawn to her room. She denies depression, anxiety, SI, HI, AH & VH. Her affect is bright. She alert & oriented X 4. Pleasant & cooperative. She medication compliant. She states that she does not feel stress while she here. No thought of hurting herself. She remains on close observation for safety.

## 2021-04-11 NOTE — GROUP NOTE
IP  GROUP DOCUMENTATION INDIVIDUAL Group Therapy Note Date: 4/11/2021 Group Start Time: 6929 Group End Time: 0669 Group Topic: Education Group - Inpatient SRM 2  NON ACUTE Asha Walden [facility] GROUP DOCUMENTATION GROUP Group Therapy Note Facilitated group discussion focused on defense mechanisms about how and when each person has previously used them Attendees: 11/14 Attendance: Attended Patient's Goal:  *STG: Verbalizes anger, guilt, and other feelings in a constructive manor Interventions/techniques: Informed and Supported Follows Directions: Followed directions Interactions: Interacted appropriately Mental Status: Calm and Flat Behavior/appearance: Attentive and Cooperative Goals Achieved: Able to engage in interactions, Able to listen to others, Able to reflect/comment on own behavior and Able to self-disclose Additional Notes:  Pt was receptive to intervention discussion. Pt reported she has acted out by harming herself and running away. Pt reported she has been in denial of needing her medications and of needing to get help due to her self harm. Pt stated she has devalued herself because she felt like she was a failure. Eugenie Ojeda, RT Intern

## 2021-04-11 NOTE — GROUP NOTE
[facility] GROUP DOCUMENTATION INDIVIDUAL Group Therapy Note Date: 4/11/2021 Group Start Time: 1100 Group End Time: 2536 Group Topic: Process Group - Inpatient SRM 2  NON ACUTE [employer] [facility] GROUP DOCUMENTATION GROUP Group Therapy Note Attendees: 10 out of 14 Patients were invited to group and encouraged to share their thoughts, feelings, and emotions. Patients were asked to share their goals for the day with the group as well. Patients were also asked to speak about the coping skills that they have gained while hospitalized. Lastly, patients were encouraged to listen respectfully to and be supportive of their peers. Attendance: Attended Patient's Goal:  Express feelings openly and develop coping skills Interventions/techniques: Validated, Promoted peer support, Provide feedback and Supported Follows Directions: Followed directions Interactions: Interacted appropriately Mental Status: Calm, Depressed and Flat Behavior/appearance: Attentive and Cooperative Goals Achieved: Able to engage in interactions, Able to listen to others, Able to self-disclose, Displayed empathy, Identified feelings and Identified triggers Additional Notes:  Pt stated that her goal for today is \"to stay positive. \" Pt was otherwise quiet and respectful during group. [place] ex-wife

## 2021-04-11 NOTE — BH NOTES
Pt medication compliant and no negative effects from medications observed or reported. Pt out of room in the morning and among peers for meals. Pt affect generally flat on observation. Affect bright and varied on approach. Pt attending groups. Minimal interactions initiated among peers. Pt reported no suicidal ideation her on the unit. No signs of self harm. Continue 15 minute checks to maintain pt safety.

## 2021-04-11 NOTE — GROUP NOTE
DARRIAN  GROUP DOCUMENTATION INDIVIDUAL Group Therapy Note Date: 4/11/2021 Group Start Time: 1 Group End Time: 1991 Group Topic: Nursing SRM 2 [facility] ACUTE Ben Treadwell LPN IP  GROUP DOCUMENTATION GROUP Group Therapy Note Attendees: 10/14 Attendance: Attended Patient's Goal: ID facts & myths about Interventions/techniques: Challenged Follows Directions: Followed directions Interactions: Interacted appropriately Mental Status: Calm Behavior/appearance: Attentive Goals Achieved: Able to listen to others Additional Notes:  Pt was verbally active in group.  
Claire Thomson LPN

## 2021-04-12 PROCEDURE — 65220000003 HC RM SEMIPRIVATE PSYCH

## 2021-04-12 PROCEDURE — 74011250637 HC RX REV CODE- 250/637: Performed by: PSYCHIATRY & NEUROLOGY

## 2021-04-12 RX ORDER — ARIPIPRAZOLE 5 MG/1
10 TABLET ORAL DAILY
Status: DISCONTINUED | OUTPATIENT
Start: 2021-04-13 | End: 2021-04-13 | Stop reason: HOSPADM

## 2021-04-12 RX ADMIN — MIRTAZAPINE 15 MG: 15 TABLET, FILM COATED ORAL at 21:40

## 2021-04-12 RX ADMIN — ARIPIPRAZOLE 5 MG: 5 TABLET ORAL at 08:48

## 2021-04-12 RX ADMIN — NORETHINDRONE ACETATE AND ETHINYL ESTRADIOL 1 TABLET: KIT at 21:42

## 2021-04-12 NOTE — BH NOTES
The pt has been resting quietly in bed without any distress. When making safety rounds, the pt has aroused. The pt has a broken sleep pattern. Respirations are quiet and unlabored. She remains on close observation with every 15 minute rounding for safety. Throughout the evening, the pt sat quietly in the milieu watching T.V. and appears withdrawn. She denies having any anxiety, depression,  SI, HI, A/VH. Her affect is flat. She is pleasant upon approach with minimal interaction. No c/o pain or discomfort. The pt received scheduled Remeron and her birth control pill. No prn medication requested. She has accepted snacks and something to drink. VSS.

## 2021-04-12 NOTE — PROGRESS NOTES
Progress Note  Date:2021       Room:[facility]  Patient Name:Cristy Downey     Date of Birth:     Age:20 y.o. Subjective    Subjective:  Symptoms:  Stable. Activity level: Normal.     Felix Boss still presents anxious and depressed, but states she ahs has been feeling better everyday. She has been cooperative and complaint with meds. She states that she was feeling overwhelmed with school and work and has been not able to keep up. She states she has been depressed and dint want to accept help. She currently denies any suicidal thoughts. Review of Systems   Psychiatric/Behavioral: Negative for hallucinations, self-injury and suicidal ideas. The patient is nervous/anxious. Objective         Vitals Last 24 Hours:  TEMPERATURE:  Temp  Av.9 °F (36.6 °C)  Min: 97.6 °F (36.4 °C)  Max: 98.2 °F (36.8 °C)  RESPIRATIONS RANGE: Resp  Av  Min: 18  Max: 18  PULSE OXIMETRY RANGE: SpO2  Av %  Min: 97 %  Max: 97 %  PULSE RANGE: Pulse  Av.5  Min: 71  Max: 82  BLOOD PRESSURE RANGE: Systolic (72RYU), HK , Min:98 , UAF:097   ; Diastolic (05XGZ), PGO:64, Min:64, Max:72    I/O (24Hr): No intake or output data in the 24 hours ending 21  Objective  Labs/Imaging/Diagnostics    Labs:  CBC:No results for input(s): WBC, RBC, HGB, HCT, MCV, RDW, PLT, HGBEXT, HCTEXT, PLTEXT in the last 72 hours. CHEMISTRIES:No results for input(s): NA, K, CL, CO2, BUN, CA, PHOS, MG in the last 72 hours. No lab exists for component: CREATININE, GLUCOSEPT/INR:No results for input(s): INR, INREXT in the last 72 hours. No lab exists for component: PROTIME  APTT:No results for input(s): APTT in the last 72 hours. LIVER PROFILE:No results for input(s): AST, ALT in the last 72 hours.     No lab exists for component: BILIDIR, BILITOT, ALKPHOS  No results found for: ALT, AST, GGT, GGTP, AP, APIT, APX, CBIL, TBIL, TBILI    Imaging Last 24 Hours:  No results found.  Assessment//Plan   Active Problems:    Depression (4/4/2021)      Assessment:    Condition: In stable condition. Improving. Plan:   Administer medications as ordered. (Continue classes medications  Provided support psychoeducation  Family meeting scheduled for today  Patient tolerating medications no side effects  No active suicidal or homicidal ideations voiced   still presents anxious  Does not want any further titration or addition of Prozac that she has been recently started on the outpatient.   Patient would benefit from reaching out to her counselor   and patient wants medical leave from school      Current Facility-Administered Medications:     (START ON 4/13/2021) ARIPiprazole (ABILIFY) tablet 10 mg, 10 mg, Oral, DAILY, Chelsea Solo MD    mirtazapine (REMERON) tablet 15 mg, 15 mg, Oral, QHS, Chelsea Solo MD, 15 mg at 04/11/21 2226    OLANZapine (ZyPREXA) tablet 5 mg, 5 mg, Oral, Q6H PRN, Samantha Jones MD    haloperidol lactate (HALDOL) injection 5 mg, 5 mg, IntraMUSCular, Q6H PRN, Theron Long MD    benztropine (COGENTIN) tablet 1 mg, 1 mg, Oral, BID PRN, Samantha Jones MD    diphenhydrAMINE (BENADRYL) injection 50 mg, 50 mg, IntraMUSCular, BID PRN, Theron Long MD    hydrOXYzine HCL (ATARAX) tablet 50 mg, 50 mg, Oral, TID PRN, Samantha Jones MD    LORazepam (ATIVAN) injection 1 mg, 1 mg, IntraMUSCular, Q4H PRN, Theron Long MD    traZODone (DESYREL) tablet 50 mg, 50 mg, Oral, QHS PRN, Theron Long MD, 50 mg at 04/09/21 2041    acetaminophen (TYLENOL) tablet 650 mg, 650 mg, Oral, Q4H PRN, Samantha Jones MD    magnesium hydroxide (MILK OF MAGNESIA) 400 mg/5 mL oral suspension 30 mL, 30 mL, Oral, DAILY PRN, Carlitos Jones MD    ziprasidone (GEODON) 20 mg in sterile water (preservative free) 1 mL injection, 20 mg, IntraMUSCular, Q12H PRN, Theron Long MD    norethindrone-ethinyl estradiol-iron (MICROGESTIN FE1.5/30) 1.5 mg-30 mcg (21)/75 mg (7) tablet tab 1 Tab (Patient Supplied), 1 Tab, Oral, QHS, Donnie Hollis MD, 1 Tab at 04/11/21 7545).        Electronically signed by Sherry Preston MD on 4/12/2021 at 7:49 PM

## 2021-04-12 NOTE — BH NOTES
FAMILY SESSION    The therapist facilitated a family session between the patient and her mother on the phone. The patient was presenting with a broad affect and congruent mood. She also exhibited increased insight. She spoke about how she was beginning to feel overwhelmed with school work, and not being able to manage that and a job. She said that because she is a \"perfectionist\" she then felt like a failure when things began not going her way in school. She said that this thought triggered this depressive episode. She still denied that it was a suciide attempt, and said that instead it was a cry for help, and denied wanting to die. She identified that her warning signs are sleeping all day, not taking care of herself, lack of motivation. She also said that her pride got in the way of her not reaching out for help. She then said that when she did reach out for help and spoke with the school therapist she wanted the therapist to \"read her mind\" instead of telling her things that she wanted, which she was able to challenge as an irrational thought. Her mother encouraged her to find a support system there, stating that she is all the way in [place], and would like for her to have someone closer who she can call. The therapist questioned her about her roommates, and previously feeling anger towards them for calling 911. She said that she was mostly just bitter because she knows that they have their own problems too that they are not addressing. The patient's mom encouraged her to \"unlearn\" things, specifically hurting herself in order to get her needs met, and normalized calling 911 if someone was self-harming. The patient said that she would feel comfortable returning to the school's therapist, and continuing to take medication.  She said that she has noticed an improvement in her mood since being at the hospital. She also said that she would talk to her school therapist about taking a medical leave of absence from school once she returns.

## 2021-04-12 NOTE — BH NOTES
Behavioral Health Treatment Team Note     Patient goal(s) for today: 'I do not have a goal'   Treatment team focus/goals: medication management, dc planning, group therapy     Progress note: Pt presented with a flat affect and congruent mood. Pt seen sleeping in bed when this writer work her up. Pt provided short answers and did not wish to provide many details to this writer. Pt stated that was attending groups on the weekend. Pt denies any SI/HI/ACH, anxiety or depression at this time. Pt in need of inpatient level of care due to severity of suicide attempted and the need for dc planning.     LOS:  8  Expected LOS: 9    Insurance info/prescription coverage:  [employer] [place] of [place].   Date of last family contact:  4/9/21  Family requesting physician contact today:  no  Discharge plan:  Therapist will continue to work together to determine [address] in the home:  no   Outpatient provider(s):  [facility]     Participating treatment team members: Leon Alcantara, * (assigned SW), [employer]

## 2021-04-12 NOTE — PROGRESS NOTES
Problem: Depressed Mood (Adult/Pediatric)  Goal: *STG: Remains safe in hospital  Outcome: Progressing Towards Goal   Pt remains safe while in the hospital. She denies having any suicidal thoughts and no gestures noted. No injuries reported or noted. Problem: Depressed Mood (Adult/Pediatric)  Goal: *STG: Complies with medication therapy  Outcome: Progressing Towards Goal   Pt is med complaint. Problem: Falls - Risk of  Goal: *Absence of Falls  Description: Document Veradal Batters Fall Risk and appropriate interventions in the flowsheet. Outcome: Progressing Towards Goal  Note: Fall Risk Interventions:     Pt ambulates independently. No falls reported or noted.

## 2021-04-12 NOTE — GROUP NOTE
[facility] GROUP DOCUMENTATION INDIVIDUAL Group Therapy Note Date: 4/12/2021 Group Start Time: 1100 Group End Time: 2193 Group Topic: Process Group - Inpatient SRM CARE MANAGEMENT Joaquim Tang LCSW [facility] GROUP DOCUMENTATION GROUP Group Therapy Note Pts participated in group therapy. Pts were asked a check in question about how they were feeling and what is something that they are looking forwards too. Pts then were asked to discuss and share thoughts feelings, and emotions that they may be feeling. Pts were encouraged to share with each other and provide feedback. Pts ended group by sharing one positive thing. Attendees: 13/15 Attendance: Attended Patient's Goal:  Pt working towards goal of attending group therapy Interventions/techniques: Validated, Promoted peer support and Provide feedback Follows Directions: Followed directions Interactions: Interacted appropriately Mental Status: Congruent Behavior/appearance: Attentive and Cooperative Goals Achieved: Able to engage in interactions, Able to listen to others, Able to give feedback to another, Able to reflect/comment on own behavior and Able to manage/cope with feelings Additional Notes:  Pt was very attentive in group therapy. Pt stated that she was a 5/10 and that she was looking forward to going home. Pt expressed frustration about not being moved to the front unit and stated that she felt that she was being told things to appease her. Pt stated that her one positive thing was that she was able to create a stable sleeping schedule. Frankey Boatman, LCSW

## 2021-04-12 NOTE — PROGRESS NOTES
Discussed case interviewed patient  Taking and tolerating medications well  She is on Abilify 5 mg daily  And Remeron 15 mg nightly  Attends groups interacts with peers and staff  Remains with flat affect  Denies hearing voices denies suicidal ideations  Pledges for safety on the unit  No overt aggression    Mental status exam  Alert oriented fair eye contact  Speech is goal-directed soft tone  Mood is depressed affect is anxious  Thought process and illogical  Insight and judgment fair  Does not appear to respond to internal stimuli    Recommendations  Continue inpatient treatments  Follow-up with psychiatric team tomorrow

## 2021-04-12 NOTE — PROGRESS NOTES
Problem: Depressed Mood (Adult/Pediatric)  Goal: *STG: Verbalizes anger, guilt, and other feelings in a constructive manor  Outcome: Progressing Towards Goal     Problem: Depressed Mood (Adult/Pediatric)  Goal: *STG: Remains safe in hospital  Outcome: Progressing Towards Goal     Patient was able to share her feelings. Patient has been safe so far in the hospital.    Patient remains on close observation. Patient has been mostly in her room and bed. She did get up some and sit in the dayroom. She initially read her book. She was later seen talking to a peer. Patient said she was \"good. \"  She denied depression, anxiety, SI or HI. Flat affect. She was medication compliant. Continue to assess. Since going to bed, patient has been laying down quietly with her eyes closed.

## 2021-04-12 NOTE — BH NOTES
Patient pleasant upon approach, affect constricted, up for group this morning, no peer interaction noted, patient was medication compliant. She denied SI, HI, AH, VH, depression and anxiety. Patient remains on close observation, Q 15 minute checks.

## 2021-04-13 VITALS
TEMPERATURE: 97.7 F | WEIGHT: 180 LBS | OXYGEN SATURATION: 97 % | HEART RATE: 85 BPM | SYSTOLIC BLOOD PRESSURE: 114 MMHG | DIASTOLIC BLOOD PRESSURE: 70 MMHG | HEIGHT: 69 IN | RESPIRATION RATE: 18 BRPM | BODY MASS INDEX: 26.66 KG/M2

## 2021-04-13 PROCEDURE — 74011250637 HC RX REV CODE- 250/637: Performed by: PSYCHIATRY & NEUROLOGY

## 2021-04-13 RX ORDER — MIRTAZAPINE 15 MG/1
15 TABLET, FILM COATED ORAL
Qty: 30 TAB | Refills: 0 | Status: SHIPPED | OUTPATIENT
Start: 2021-04-13

## 2021-04-13 RX ORDER — ARIPIPRAZOLE 10 MG/1
10 TABLET ORAL DAILY
Qty: 30 TAB | Refills: 0 | Status: SHIPPED | OUTPATIENT
Start: 2021-04-14

## 2021-04-13 RX ORDER — TRAZODONE HYDROCHLORIDE 50 MG/1
50 TABLET ORAL
Qty: 30 TAB | Refills: 0 | Status: SHIPPED | OUTPATIENT
Start: 2021-04-13

## 2021-04-13 RX ADMIN — ARIPIPRAZOLE 10 MG: 5 TABLET ORAL at 09:01

## 2021-04-13 NOTE — BH NOTES
DAYSHIFT/DISCHARGE NOTE:     Patient is up for her breakfast this morning and is sitting in the dayroom amongst her peers. Patient is smiling and engaged appropriately in conversations. Patient stated to the writer that she was doing better and ready to get back to school/work. Patient denies having any depression and or anxiety. Denies SI/HI. Denies AH/VH. Patient is medication compliant and asked about the mg being increased to 10mg of the Abilify. Patient attends groups. Patient received orders for discharge today. Discharge instructions were reviewed and understood with the patient and patient verbalized understanding of her appointments. Medications/prescriptions were electronically sent to [facility] [address] and patient was educated where [facility] was and how to get there and she voiced understand of the directions and how to take her medications. Valuables were returned to the patient from the safe and the belongings closet. Patient's personal medication, birth control pills, were returned to the patient. Patient was picked up by her brother, Pollo Mei. Patient discharged without any complaints voiced.

## 2021-04-13 NOTE — BH NOTES
Behavioral Health Transition Record to Provider    Patient Name: Smith Crawford  YOB: 2000  Medical Record Number: [id number]  Date of Admission: 4/4/2021  Date of Discharge: 4/13/21    Attending Provider: Shruthi Steinberg MD  Discharging Provider:   To contact this individual call [phone] and ask the  to page. If unavailable, ask to be transferred to [address] [place] [address] Provider on call. [facility] Provider will be available on call 24/7 and during holidays. Primary Care Provider: None    No Known Allergies    Reason for Admission: The patient was admitted for increased depression and self-harm. Admission Diagnosis: Depression [F32.9]    * No surgery found *    No results found for this or any previous visit. Immunizations administered during this encounter: There is no immunization history on file for this patient. Screening for Metabolic Disorders for Patients on Antipsychotic Medications  (Data obtained from the EMR)    Estimated Body Mass Index  Estimated body mass index is 26.58 kg/m² as calculated from the following:    Height as of this encounter: 5' 9\" (1.753 m). Weight as of this encounter: 81.6 kg (180 lb). Vital Signs/Blood Pressure  Visit Vitals  /70   Pulse 85   Temp 97.7 °F (36.5 °C)   Resp 18   Ht 5' 9\" (1.753 m)   Wt 81.6 kg (180 lb)   SpO2 97%   BMI 26.58 kg/m²       Blood Glucose/Hemoglobin A1c  No results found for: GLU, GLUCPOC    No results found for: HBA1C, HGBE8, CDW8HGFP     Lipid Panel  No results found for: CHOL, CHOLX, CHLST, CHOLV, 014324, HDL, HDLP, LDL, LDLC, DLDLP, TGLX, TRIGL, TRIGP, CHHD, CHHDX     Discharge Diagnosis: Depression [F32.9]        Discharge Plan: The patient will be discharged back home to [place], [place]. She will continue to follow-up with a therapist from [facility], and [facility]     Discharge Medication List and Instructions: Current Discharge Medication List      START taking these medications    Details   ARIPiprazole (ABILIFY) 10 mg tablet Take 1 Tab by mouth daily. Qty: 30 Tab, Refills: 0      mirtazapine (REMERON) 15 mg tablet Take 1 Tab by mouth nightly. Qty: 30 Tab, Refills: 0      traZODone (DESYREL) 50 mg tablet Take 1 Tab by mouth nightly as needed for Sleep (For insomnia). Qty: 30 Tab, Refills: 0             Unresulted Labs (24h ago, onward)    None        To obtain results of studies pending at discharge, please contact [phone]    Follow-up Information     Follow up With Specialties Details Why Contact Info    None    None (395) Patient stated that they have no PCP      [address] have an appointment on 4/15/21 at [facility] [facility],[address] with therapist Grayson Garcia. [facility]    You have an intake appointment on 4/19/21 with Charis Golden at 11am via telehealth. Please call the office at that time.     [phone], [address] Naomi Moreno    [address] [place] [address] Street have an in-office appointment with Awa Mike for medication management on 4/27/21 at [facility].     [phone] [address] 3 [facility]          Advanced Directive:   Does the patient have an appointed surrogate decision maker? No  Does the patient have a Medical Advance Directive? No  Does the patient have a Psychiatric Advance Directive? No  If the patient does not have a surrogate or Medical Advance Directive AND Psychiatric Advance Directive, the patient was offered information on these advance directives Patient declined to complete    Patient Instructions: Please continue all medications until otherwise directed by physician. Tobacco Cessation Discharge Plan:   Is the patient a smoker and needs referral for smoking cessation? No  Patient referred to the following for smoking cessation with an appointment?  No     Patient was offered medication to assist with smoking cessation at discharge? No  Was education for smoking cessation added to the discharge instructions? No    Alcohol/Substance Abuse Discharge Plan:   Does the patient have a history of substance/alcohol abuse and requires a referral for treatment? No  Patient referred to the following for substance/alcohol abuse treatment with an appointment? Not applicable  Patient was offered medication to assist with alcohol cessation at discharge? Not applicable  Was education for substance/alcohol abuse added to discharge instructions?  Not applicable    Patient discharged to Home; discussed with patient/caregiver

## 2021-04-13 NOTE — GROUP NOTE
IP  GROUP DOCUMENTATION INDIVIDUAL Group Therapy Note Date: 4/13/2021 Group Start Time: 0889 Group End Time: 1400 Group Topic: Recreational/Music Therapy SRM 2 BH NON ACUTE [facility] GROUP DOCUMENTATION GROUP Group Therapy Note Facilitated leisure skills group focused on positive coping skills through social interactions, group activities, music and art tasks Attendees: 8/13 Attendance: Attended Patient's Goal:  Attend groups daily Interventions/techniques: Art integration and Supported Follows Directions: Followed directions Interactions: Interacted appropriately Mental Status: Calm and Happy Behavior/appearance: Cooperative Goals Achieved: Able to engage in interactions and Able to listen to others Additional Notes:  Pt was receptive to music played in group. Pt declined to work on leisure packet while in group. Pt was pulled from group early for discharge. Meron Mcgee RT Intern

## 2021-04-13 NOTE — BH NOTES
DISCHARGE SUMMARY    NAME:Cristy Quevedo  : 2000  MRN: [id number]    The patient Lisa Lui exhibits the ability to control behavior in a less restrictive environment. Patient's level of functioning is improving. No assaultive/destructive behavior has been observed for the past 24 hours. No suicidal/homicidal threat or behavior has been observed for the past 24 hours. There is no evidence of serious medication side effects. Patient has not been in physical or protective restraints for at least the past 24 hours. If weapons involved, how are they secured? The patient has denied access to firearms. Is patient aware of and in agreement with discharge plan? Yes    Arrangements for medication:  Prescriptions given to patient, given a weeks supply or 30 day supply. Copy of discharge instructions to provider?:  Yes    Arrangements for transportation home:  The patient's brother is going to pick her up from the hospital.     Keep all follow up appointments as scheduled, continue to take prescribed medications per physician instructions.   Mental health crisis number:  444 or your local mental health crisis line number at [phone]        [place] [address] Emergency WARM LINE      [phone]-MH ([phone])      M-F: 9am to 9pm      Sat & Sun: 5pm  9pm  National suicide prevention lines:                             8-105-MTRRBXH (5-783.592.1240)       8-512-851-TALK ([phone])    Crisis Text Line:  Text HOME to 547392

## 2021-04-13 NOTE — GROUP NOTE
[facility] GROUP DOCUMENTATION INDIVIDUAL Group Therapy Note Date: 4/13/2021 Group Start Time: 1100 Group End Time: 7903 Group Topic: Education Group - Inpatient SRM CARE MANAGEMENT Luis M Leon LCSW [facility] GROUP DOCUMENTATION GROUP Group Therapy Note Pts participated in psychoeducation group therapy focused on healthy relationships. Pts answered check in question about how they are feeling and what their goal for the week is. Pts then discussed healthy relationships and what they consist of. Pts were encouraged to share and give feedback to peers. Attendees: 8/12 Attendance: Attended Patient's Goal:  Pt working towards goal of attending group therapy Interventions/techniques: Informed Follows Directions: Followed directions Interactions: Interacted appropriately Mental Status: Congruent Behavior/appearance: Attentive and Cooperative Goals Achieved: Able to engage in interactions, Able to listen to others, Able to give feedback to another, Able to reflect/comment on own behavior and Able to manage/cope with feelings Additional Notes:  Pt stated that she felt okay this morning and did not wish to share for the remainder of the group. Pt was attentive and actively listened. Kiki Moseley LCSW

## 2021-04-13 NOTE — SUICIDE SAFETY PLAN
SAFETY PLAN    A suicide Safety Plan is a document that supports someone when they are having thoughts of suicide. Warning Signs that indicate a suicidal crisis may be developing: What (situations, thoughts, feelings, body sensations, behaviors, etc.) do you experience that lets you know you are beginning to think about suicide? 1. Laying in bed all day for multiple days  2. Not being able to sleep for multiple nights  3. Self-harm    Internal Coping Strategies:  What things can I do (relaxation techniques, hobbies, physical activities, etc.) to take my mind off my problems without contacting another person? 1. Running/exercise   2. Yoga/meditation  3. Painting    People and social settings that provide distraction: Who can I call or where can I go to distract me? 1. Name: Sammy Tierney (brother)  Phone: [phone]  2. Name: Cristina Glass  Phone:    3. Place: [facility]            4. Place: [address] [place] [address] whom I can ask for help: Who can I call when I need help - for example, friends, family, clergy, someone else? 1. Name: Ailcia Gonzalez                 Phone: [phone]  2. Name: Sammy Tierney   Phone: [phone]  3. Name: Jose Luis Nava  Phone: [phone]    Professionals or [address] [place] [address] I can contact during a crisis: Who can I call for help - for example, my doctor, my psychiatrist, my psychologist, a mental health provider, a suicide hotline? 1. Clinician Name: [facility]   Phone: [phone]      Clinician Pager or Emergency Contact #:     2. Clinician Name: Jose Luis Nava   Phone: [phone]      Clinician Pager or Emergency Contact #:     3. Suicide Prevention Lifeline: 2-271-799-TALK (1813)    4.  [address] [place] [address] Emergency Services -  for example, [facility] Hotline: [address] [facility]      Emergency Services Address: [facility], [address], [place] [address]      Emergency Services Phone: [phone]    Making the environment safe: How can I make my environment (house/apartment/living space) safer? For example, can I remove guns, medications, and other items? 1. I can let someone know I'm feeling suicidal so they can remove specific items  2. I can inform my roommates and discuss how we can create a safer environment.
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
## 2022-03-19 PROBLEM — F32.A DEPRESSION: Status: ACTIVE | Noted: 2021-04-04

## 2023-05-15 RX ORDER — TRAZODONE HYDROCHLORIDE 50 MG/1
50 TABLET ORAL
COMMUNITY
Start: 2021-04-13

## 2023-05-15 RX ORDER — ARIPIPRAZOLE 10 MG/1
10 TABLET ORAL DAILY
COMMUNITY
Start: 2021-04-14

## 2023-05-15 RX ORDER — MIRTAZAPINE 15 MG/1
15 TABLET, FILM COATED ORAL
COMMUNITY
Start: 2021-04-13

## 2023-07-05 ENCOUNTER — APPOINTMENT (RX ONLY)
Dept: URBAN - METROPOLITAN AREA CLINIC 71 | Facility: CLINIC | Age: 23
Setting detail: DERMATOLOGY
End: 2023-07-05

## 2023-07-05 DIAGNOSIS — L72.8 OTHER FOLLICULAR CYSTS OF THE SKIN AND SUBCUTANEOUS TISSUE: ICD-10-CM

## 2023-07-05 PROCEDURE — ? FULL BODY SKIN EXAM - DECLINED

## 2023-07-05 PROCEDURE — ? PRESCRIPTION

## 2023-07-05 PROCEDURE — ? PRESCRIPTION MEDICATION MANAGEMENT

## 2023-07-05 PROCEDURE — 99203 OFFICE O/P NEW LOW 30 MIN: CPT

## 2023-07-05 PROCEDURE — ? COUNSELING

## 2023-07-05 RX ORDER — DOXYCYCLINE HYCLATE 100 MG/1
CAPSULE, GELATIN COATED ORAL QD
Qty: 14 | Refills: 0 | Status: ERX | COMMUNITY
Start: 2023-07-05

## 2023-07-05 RX ADMIN — DOXYCYCLINE HYCLATE: 100 CAPSULE, GELATIN COATED ORAL at 00:00

## 2023-07-05 ASSESSMENT — LOCATION SIMPLE DESCRIPTION DERM: LOCATION SIMPLE: LEFT BUTTOCK

## 2023-07-05 ASSESSMENT — LOCATION ZONE DERM: LOCATION ZONE: TRUNK

## 2023-07-05 ASSESSMENT — LOCATION DETAILED DESCRIPTION DERM: LOCATION DETAILED: LEFT BUTTOCK

## 2023-07-05 NOTE — HPI: EVALUATION OF SKIN LESION(S)
What Type Of Note Output Would You Prefer (Optional)?: Standard Output
Hpi Title: Evaluation of a Skin Lesion
How Severe Are Your Spot(S)?: moderate
Have Your Spot(S) Been Treated In The Past?: has not been treated
Additional History: Patient states this is HS.

## 2023-07-05 NOTE — PROCEDURE: PRESCRIPTION MEDICATION MANAGEMENT
Initiate Treatment: Doxycycline 100 mg BID for one week (pt denies pregnancy/breastfeeding at this time)\\nPanoxyl
Render In Strict Bullet Format?: No
Detail Level: Zone
Samples Given: Panoxyl

## 2023-08-16 ENCOUNTER — APPOINTMENT (RX ONLY)
Dept: URBAN - METROPOLITAN AREA CLINIC 74 | Facility: CLINIC | Age: 23
Setting detail: DERMATOLOGY
End: 2023-08-16

## 2023-08-16 VITALS — HEIGHT: 69 IN | WEIGHT: 190 LBS

## 2023-08-16 DIAGNOSIS — L73.2 HIDRADENITIS SUPPURATIVA: ICD-10-CM | Status: INADEQUATELY CONTROLLED

## 2023-08-16 PROCEDURE — 99214 OFFICE O/P EST MOD 30 MIN: CPT

## 2023-08-16 PROCEDURE — ? COUNSELING

## 2023-08-16 PROCEDURE — ? PRESCRIPTION MEDICATION MANAGEMENT

## 2023-08-16 PROCEDURE — ? PRESCRIPTION

## 2023-08-16 RX ORDER — CLINDAMYCIN PHOSPHATE 10 MG/ML
LOTION TOPICAL
Qty: 60 | Refills: 5 | Status: ERX | COMMUNITY
Start: 2023-08-16

## 2023-08-16 RX ORDER — DOXYCYCLINE HYCLATE 100 MG/1
CAPSULE, GELATIN COATED ORAL BID
Qty: 60 | Refills: 3 | Status: ERX | COMMUNITY
Start: 2023-08-16

## 2023-08-16 RX ADMIN — DOXYCYCLINE HYCLATE: 100 CAPSULE, GELATIN COATED ORAL at 00:00

## 2023-08-16 RX ADMIN — CLINDAMYCIN PHOSPHATE: 10 LOTION TOPICAL at 00:00

## 2023-08-16 ASSESSMENT — HURLEY STAGE
IN YOUR EXPERIENCE, AMONG ALL PATIENTS YOU HAVE SEEN WITH THIS CONDITION, HOW SEVERE IS THIS PATIENT'S CONDITION?: HURLEY STAGE I: ABSCESS FORMATION (SINGLE OR MULTIPLE) WITHOUT SINUS TRACTS OR SCARRING

## 2023-08-16 ASSESSMENT — LOCATION ZONE DERM
LOCATION ZONE: AXILLAE
LOCATION ZONE: TRUNK
LOCATION ZONE: LEG

## 2023-08-16 ASSESSMENT — LOCATION DETAILED DESCRIPTION DERM
LOCATION DETAILED: LEFT ANTERIOR PROXIMAL THIGH
LOCATION DETAILED: RIGHT AXILLARY VAULT
LOCATION DETAILED: LEFT AXILLARY VAULT
LOCATION DETAILED: RIGHT ANTERIOR PROXIMAL THIGH
LOCATION DETAILED: RIGHT BUTTOCK
LOCATION DETAILED: LEFT PROXIMAL MEDIAL POSTERIOR THIGH

## 2023-08-16 ASSESSMENT — LOCATION SIMPLE DESCRIPTION DERM
LOCATION SIMPLE: LEFT POSTERIOR THIGH
LOCATION SIMPLE: RIGHT THIGH
LOCATION SIMPLE: RIGHT BUTTOCK
LOCATION SIMPLE: LEFT THIGH
LOCATION SIMPLE: RIGHT AXILLARY VAULT
LOCATION SIMPLE: LEFT AXILLARY VAULT

## 2023-08-16 NOTE — PROCEDURE: PRESCRIPTION MEDICATION MANAGEMENT
Plan: Discussed Biologics Humira and Cosyntyx as needed if persists or worsens. Will give prescription for Doxycycline and topical medication to have for flare.
Initiate Treatment: Clindamycin 1% gel apply twice daily to affected areas\\nDoxycycline 100 mg cap po twice daily for flares
Render In Strict Bullet Format?: No
Continue Regimen: Panoxyl cleanser
Detail Level: Zone

## 2023-08-16 NOTE — HPI: RASH (HIDRADENITIS SUPPURATIVA)
How Severe Is It?: moderate
Is This A New Presentation, Or A Follow-Up?: Rash
Additional History: Doxycycline has helped

## 2024-02-16 ENCOUNTER — APPOINTMENT (RX ONLY)
Dept: URBAN - METROPOLITAN AREA CLINIC 74 | Facility: CLINIC | Age: 24
Setting detail: DERMATOLOGY
End: 2024-02-16

## 2024-02-16 DIAGNOSIS — L73.2 HIDRADENITIS SUPPURATIVA: ICD-10-CM

## 2024-02-16 PROCEDURE — ? PRESCRIPTION

## 2024-02-16 PROCEDURE — ? COUNSELING

## 2024-02-16 PROCEDURE — ? PRESCRIPTION MEDICATION MANAGEMENT

## 2024-02-16 PROCEDURE — 99214 OFFICE O/P EST MOD 30 MIN: CPT

## 2024-02-16 RX ORDER — CLINDAMYCIN PHOSPHATE 10 MG/ML
LOTION TOPICAL
Qty: 60 | Refills: 5 | Status: ERX

## 2024-02-16 ASSESSMENT — LOCATION ZONE DERM: LOCATION ZONE: LEG

## 2024-02-16 ASSESSMENT — LOCATION SIMPLE DESCRIPTION DERM: LOCATION SIMPLE: LEFT THIGH

## 2024-02-16 ASSESSMENT — LOCATION DETAILED DESCRIPTION DERM: LOCATION DETAILED: LEFT ANTERIOR PROXIMAL THIGH

## 2024-10-03 ENCOUNTER — APPOINTMENT (RX ONLY)
Dept: URBAN - METROPOLITAN AREA CLINIC 74 | Facility: CLINIC | Age: 24
Setting detail: DERMATOLOGY
End: 2024-10-03

## 2024-10-03 DIAGNOSIS — L72.8 OTHER FOLLICULAR CYSTS OF THE SKIN AND SUBCUTANEOUS TISSUE: ICD-10-CM | Status: INADEQUATELY CONTROLLED

## 2024-10-03 DIAGNOSIS — L73.2 HIDRADENITIS SUPPURATIVA: ICD-10-CM

## 2024-10-03 PROCEDURE — 99214 OFFICE O/P EST MOD 30 MIN: CPT

## 2024-10-03 PROCEDURE — ? PRESCRIPTION

## 2024-10-03 PROCEDURE — ? ADDITIONAL NOTES

## 2024-10-03 PROCEDURE — ? COUNSELING

## 2024-10-03 RX ORDER — PREDNISONE 10 MG/1
TABLET ORAL
Qty: 18 | Refills: 0 | Status: ERX | COMMUNITY
Start: 2024-10-03

## 2024-10-03 RX ORDER — CLINDAMYCIN HYDROCHLORIDE 300 MG/1
CAPSULE ORAL
Qty: 12 | Refills: 0 | Status: ERX | COMMUNITY
Start: 2024-10-03

## 2024-10-03 RX ADMIN — PREDNISONE: 10 TABLET ORAL at 00:00

## 2024-10-03 RX ADMIN — CLINDAMYCIN HYDROCHLORIDE: 300 CAPSULE ORAL at 00:00

## 2024-10-03 ASSESSMENT — LOCATION ZONE DERM
LOCATION ZONE: FACE
LOCATION ZONE: LEG

## 2024-10-03 ASSESSMENT — LOCATION SIMPLE DESCRIPTION DERM
LOCATION SIMPLE: LEFT THIGH
LOCATION SIMPLE: RIGHT EYEBROW

## 2024-10-03 ASSESSMENT — LOCATION DETAILED DESCRIPTION DERM
LOCATION DETAILED: LEFT ANTERIOR PROXIMAL THIGH
LOCATION DETAILED: RIGHT MEDIAL EYEBROW

## 2024-10-03 NOTE — PROCEDURE: ADDITIONAL NOTES
Additional Notes: Discussed Cosentyx again pt is still considering it.
Detail Level: Simple
Render Risk Assessment In Note?: no

## 2024-10-03 NOTE — HPI: SKIN LESION
What Type Of Note Output Would You Prefer (Optional)?: Standard Output
How Severe Is Your Skin Lesion?: moderate
Is This A New Presentation, Or A Follow-Up?: Skin Lesion
Additional History: Pt has HS and a history of cysts. She has had one on her cheek before that was drained. She went to the ER yesterday and was prescribed Clindamycin.

## 2025-03-31 ENCOUNTER — RX ONLY (RX ONLY)
Age: 25
End: 2025-03-31

## 2025-03-31 RX ORDER — CLINDAMYCIN PHOSPHATE 10 MG/ML
LOTION TOPICAL
Qty: 60 | Refills: 2 | Status: ERX

## 2025-04-21 ENCOUNTER — APPOINTMENT (OUTPATIENT)
Dept: URBAN - METROPOLITAN AREA CLINIC 74 | Facility: CLINIC | Age: 25
Setting detail: DERMATOLOGY
End: 2025-04-21

## 2025-04-21 DIAGNOSIS — L72.8 OTHER FOLLICULAR CYSTS OF THE SKIN AND SUBCUTANEOUS TISSUE: ICD-10-CM

## 2025-04-21 DIAGNOSIS — L73.2 HIDRADENITIS SUPPURATIVA: ICD-10-CM

## 2025-04-21 PROCEDURE — ? INTRALESIONAL KENALOG

## 2025-04-21 PROCEDURE — 99214 OFFICE O/P EST MOD 30 MIN: CPT | Mod: 25

## 2025-04-21 PROCEDURE — ? PRESCRIPTION

## 2025-04-21 PROCEDURE — ? ADDITIONAL NOTES

## 2025-04-21 PROCEDURE — 11900 INJECT SKIN LESIONS </W 7: CPT

## 2025-04-21 PROCEDURE — ? PRESCRIPTION MEDICATION MANAGEMENT

## 2025-04-21 PROCEDURE — ? COUNSELING

## 2025-04-21 RX ORDER — DOXYCYCLINE HYCLATE 100 MG/1
CAPSULE, GELATIN COATED ORAL BID
Qty: 28 | Refills: 4 | Status: ERX

## 2025-04-21 ASSESSMENT — HURLEY STAGE
IN YOUR EXPERIENCE, AMONG ALL PATIENTS YOU HAVE SEEN WITH THIS CONDITION, HOW SEVERE IS THIS PATIENT'S CONDITION?: HURLEY STAGE II: SINGLE OR MULTIPLE, WIDELY SEPARATED RECURRENT ABSCESSES WITH SINUS TRACT FORMATION AND SCARRING

## 2025-04-21 ASSESSMENT — LOCATION ZONE DERM
LOCATION ZONE: LEG
LOCATION ZONE: LIP

## 2025-04-21 ASSESSMENT — LOCATION DETAILED DESCRIPTION DERM
LOCATION DETAILED: LEFT ANTERIOR PROXIMAL THIGH
LOCATION DETAILED: RIGHT LOWER CUTANEOUS LIP

## 2025-04-21 ASSESSMENT — LOCATION SIMPLE DESCRIPTION DERM
LOCATION SIMPLE: LEFT THIGH
LOCATION SIMPLE: RIGHT LIP

## 2025-04-21 NOTE — PROCEDURE: PRESCRIPTION MEDICATION MANAGEMENT
Plan: Discussed Biologics Humira and Cosyntyx as needed if persists or worsens. Will continue prescription for Doxycycline and topical medication to have for flare.
Render In Strict Bullet Format?: No
Continue Regimen: doxycycline hyclate 100 mg capsule BID\\nQuantity: 60.0 Capsule  Days Supply: 30\\nSi cap po twice daily for flare\\n\\nclindamycin 1 % lotion \\nQuantity: 60.0 g  Days Supply: 30\\nSig: Apply to affected areas once daily after shower.\\n\\nPanoxyl cleanser
Detail Level: Zone
Continue Regimen: Warm compresses\\nClindamycin lotion
Initiate Treatment: doxycycline hyclate 100 mg capsule BID\\nQuantity: 28.0 Capsule  Days Supply: 14\\nSig: Take one capsule by mouth twice daily for 14 days with food.

## 2025-04-21 NOTE — PROCEDURE: ADDITIONAL NOTES
Render Risk Assessment In Note?: no
Additional Notes: If the lesion starts to abscess, but not draining on own, to consider I&D
Detail Level: Simple

## 2025-04-21 NOTE — PROCEDURE: INTRALESIONAL KENALOG
Total Volume (Ccs): .2
X Size Of Lesion In Cm (Optional): 1.5
Expiration Date For Kenalog (Optional): JUN 2027
How Many Mls Were Removed From The 10 Mg/Ml (5ml) Vial When Preparing The Injectable Solution?: 0.2
Bill For Wasted Drug (Kenalog)?: no
Medical Necessity Clause: This procedure was medically necessary because the lesions that were treated were:
Concentration Of Kenalog Solution Injected (Mg/Ml): 5.0
Require Ndc Code?: Yes
Kenalog Preparation: Kenalog
How Many Mls Were Removed From The 40 Mg/Ml (1ml) Vial When Preparing The Injectable Solution?: 0
Lot # For Kenalog (Optional): [id number]
Ndc# For Kenalog Only: 8067-3696-21
Which Kenalog Vial Was Used?: Kenalog 10 mg/ml (5 ml vial)
Kenalog Type Of Vial: Multiple Dose
Administered By (Optional): PA
Treatment Number (Optional): 1
Detail Level: Detailed
Consent: The risks of atrophy were reviewed with the patient.